# Patient Record
Sex: FEMALE | Race: WHITE | NOT HISPANIC OR LATINO | Employment: FULL TIME | URBAN - NONMETROPOLITAN AREA
[De-identification: names, ages, dates, MRNs, and addresses within clinical notes are randomized per-mention and may not be internally consistent; named-entity substitution may affect disease eponyms.]

---

## 2020-07-27 ENCOUNTER — LAB (OUTPATIENT)
Dept: LAB | Facility: HOSPITAL | Age: 50
End: 2020-07-27

## 2020-07-27 ENCOUNTER — TRANSCRIBE ORDERS (OUTPATIENT)
Dept: ADMINISTRATIVE | Facility: HOSPITAL | Age: 50
End: 2020-07-27

## 2020-07-27 DIAGNOSIS — M13.0 POLYARTHROPATHY: Primary | ICD-10-CM

## 2020-07-27 DIAGNOSIS — D89.89 RADIATION CHIMERA (HCC): ICD-10-CM

## 2020-07-27 DIAGNOSIS — D64.9 ANEMIA, UNSPECIFIED TYPE: ICD-10-CM

## 2020-07-27 DIAGNOSIS — M13.0 POLYARTHROPATHY: ICD-10-CM

## 2020-07-27 LAB — ERYTHROCYTE [SEDIMENTATION RATE] IN BLOOD: 6 MM/HR (ref 0–20)

## 2020-07-27 PROCEDURE — 83516 IMMUNOASSAY NONANTIBODY: CPT

## 2020-07-27 PROCEDURE — 85652 RBC SED RATE AUTOMATED: CPT

## 2020-07-27 PROCEDURE — 36415 COLL VENOUS BLD VENIPUNCTURE: CPT

## 2020-07-27 PROCEDURE — 84550 ASSAY OF BLOOD/URIC ACID: CPT

## 2020-07-27 PROCEDURE — 82550 ASSAY OF CK (CPK): CPT

## 2020-07-27 PROCEDURE — 86140 C-REACTIVE PROTEIN: CPT

## 2020-07-27 PROCEDURE — 86235 NUCLEAR ANTIGEN ANTIBODY: CPT

## 2020-07-28 LAB
CK SERPL-CCNC: 40 U/L (ref 20–180)
CRP SERPL-MCNC: 0.45 MG/DL (ref 0–0.5)
URATE SERPL-MCNC: 5.1 MG/DL (ref 2.4–5.7)

## 2020-07-29 LAB — ACTIN IGG SERPL-ACNC: 4 UNITS (ref 0–19)

## 2020-07-30 LAB — HISTONE IGG SER IA-ACNC: 0.8 UNITS (ref 0–0.9)

## 2021-12-15 ENCOUNTER — TRANSCRIBE ORDERS (OUTPATIENT)
Dept: ADMINISTRATIVE | Facility: HOSPITAL | Age: 51
End: 2021-12-15

## 2021-12-15 DIAGNOSIS — S83.281A ACUTE LATERAL MENISCUS TEAR OF RIGHT KNEE, INITIAL ENCOUNTER: Primary | ICD-10-CM

## 2024-07-15 ENCOUNTER — APPOINTMENT (OUTPATIENT)
Dept: GENERAL RADIOLOGY | Facility: HOSPITAL | Age: 54
End: 2024-07-15
Payer: COMMERCIAL

## 2024-07-15 ENCOUNTER — HOSPITAL ENCOUNTER (EMERGENCY)
Facility: HOSPITAL | Age: 54
Discharge: HOME | End: 2024-07-15
Payer: COMMERCIAL

## 2024-07-15 VITALS
DIASTOLIC BLOOD PRESSURE: 99 MMHG | TEMPERATURE: 97.9 F | HEART RATE: 76 BPM | SYSTOLIC BLOOD PRESSURE: 174 MMHG | OXYGEN SATURATION: 99 % | RESPIRATION RATE: 17 BRPM | WEIGHT: 250 LBS | BODY MASS INDEX: 39.24 KG/M2 | HEIGHT: 67 IN

## 2024-07-15 DIAGNOSIS — S83.91XA SPRAIN OF RIGHT KNEE, UNSPECIFIED LIGAMENT, INITIAL ENCOUNTER: Primary | ICD-10-CM

## 2024-07-15 PROCEDURE — 73562 X-RAY EXAM OF KNEE 3: CPT | Performed by: RADIOLOGY

## 2024-07-15 PROCEDURE — 99283 EMERGENCY DEPT VISIT LOW MDM: CPT

## 2024-07-15 PROCEDURE — 73562 X-RAY EXAM OF KNEE 3: CPT

## 2024-07-15 NOTE — DISCHARGE INSTRUCTIONS
Your imaging today was negative.  Unfortunately an x-ray is not the best test to rule out knee pathology.  You will very likely need an MRI to be performed.  I recommend he follow-up outpatient with your orthopedic surgeon to obtain an MRI of their specification.  At this time I recommend using a knee brace as we have given you today.  You may take ibuprofen up to twice daily as needed for pain.  Icing may reduce inflammation in the acute period.

## 2024-07-15 NOTE — ED PROVIDER NOTES
Subjective   History of Present Illness  54-year-old female with history of right knee meniscal tear presenting with acute right knee pain.  Patient states she was walking to the library on a flat surface when suddenly she felt a sharp pain in the lateral portion of her right knee and she was suddenly unable to bear weight on the knee.  Patient admits some swelling.  Denies any erythema, fever, redness.  Of note 3 years ago patient tore her right meniscus she had arthroscope performed, she was later told that she will likely need a right knee replacement however she has elected for conservative management for the last 3 years.  Patient has a an established relationship with an orthopedic surgeon in [place].  Patient decided to come into the ED just to be evaluated.      Review of Systems   Constitutional: Negative.  Negative for fever.   HENT: Negative.     Respiratory: Negative.     Cardiovascular: Negative.  Negative for chest pain.   Gastrointestinal: Negative.  Negative for abdominal pain.   Endocrine: Negative.    Genitourinary: Negative.  Negative for dysuria.   Musculoskeletal:  Positive for arthralgias and joint swelling.   Skin: Negative.    Neurological: Negative.    Psychiatric/Behavioral: Negative.     All other systems reviewed and are negative.      No past medical history on file.    Allergies   Allergen Reactions    Penicillin G Other (See Comments)     Injection site reaction      Tetanus Toxoids Other (See Comments)     Injection site rash         No past surgical history on file.    No family history on file.    Social History     Socioeconomic History    Marital status:            Objective   Physical Exam  Vitals and nursing note reviewed.   Constitutional:       General: She is not in acute distress.     Appearance: She is well-developed. She is not diaphoretic.   HENT:      Head: Normocephalic and atraumatic.      Right Ear: External ear normal.      Left Ear: External ear normal.       Nose: Nose normal.   Eyes:      Conjunctiva/sclera: Conjunctivae normal.      Pupils: Pupils are equal, round, and reactive to light.   Neck:      Vascular: No JVD.      Trachea: No tracheal deviation.   Cardiovascular:      Rate and Rhythm: Normal rate and regular rhythm.      Heart sounds: Normal heart sounds. No murmur heard.  Pulmonary:      Effort: Pulmonary effort is normal. No respiratory distress.      Breath sounds: Normal breath sounds. No wheezing.   Abdominal:      General: Bowel sounds are normal.      Palpations: Abdomen is soft.      Tenderness: There is no abdominal tenderness.   Musculoskeletal:         General: Swelling, tenderness and signs of injury present. No deformity. Normal range of motion.      Cervical back: Normal range of motion and neck supple.      Comments: Right knee swelling in the lateral compartment.  Anterior and posterior drawer test negative.  Standing grind test is positive for meniscal damage.     Skin:     General: Skin is warm and dry.      Coloration: Skin is not pale.      Findings: No erythema or rash.   Neurological:      Mental Status: She is alert and oriented to person, place, and time.      Cranial Nerves: No cranial nerve deficit.   Psychiatric:         Behavior: Behavior normal.         Thought Content: Thought content normal.         Procedures           ED Course  ED Course as of 07/15/24 1854   Mon Jul 15, 2024   1850 Patient found to be acutely hypertensive due to pain.  Offered pain medication at the time of exam patient declined anything.  Patient understands she needs to be evaluated for hypertension in the outpatient setting.  If she begins experiencing headache, changes in vision, chest pain, shortness of breath she is to return to the ED immediately or call 911. [GF]      ED Course User Index  [GF] Juventino Mari, DO                                             Medical Decision Making  54-year-old female with history of right knee issues presents  with acute right knee pain.  Physical exam consistent with a meniscal injury.  Discussed limitations of x-ray imaging at this time patient would likely need MRI.  Patient understands and agrees.  However patient still requesting x-ray.  X-ray was performed which showed no acute fracture or acute processes.  Patient was found to be hypertensive and acute pain on initial examination.  Patient denies any pain medication at this time furthermore patient states she has whitecoat hypertension.  Patient denies any chest pain, shortness of breath, headache.  Patient encouraged to follow-up outpatient with her established orthopedic surgeon in [place] for an MRI to be performed.    Problems Addressed:  Sprain of right knee, unspecified ligament, initial encounter: complicated acute illness or injury    Amount and/or Complexity of Data Reviewed  Radiology: ordered.    XR Knee 3 View Right    Result Date: 7/15/2024  Degenerative changes.   [id number]  Zip code: [id number]      This report was finalized on 7/15/2024 6:29 PM by Dr. Varun Aiken MD.         Final diagnoses:   Sprain of right knee, unspecified ligament, initial encounter       ED Disposition  ED Disposition       ED Disposition   Discharge    Condition   Stable    Comment   --               Juventino Hyman MD  [address] [place] [id number]  [phone]    Schedule an appointment as soon as possible for a visit in 1 week           Medication List      No changes were made to your prescriptions during this visit.            Juventino Mari,   07/15/24 8721

## 2024-07-26 ENCOUNTER — TRANSCRIBE ORDERS (OUTPATIENT)
Dept: ADMINISTRATIVE | Facility: HOSPITAL | Age: 54
End: 2024-07-26
Payer: COMMERCIAL

## 2024-07-26 DIAGNOSIS — M25.561 PAIN IN JOINT OF RIGHT KNEE: Primary | ICD-10-CM

## 2024-08-16 ENCOUNTER — APPOINTMENT (OUTPATIENT)
Dept: CT IMAGING | Facility: HOSPITAL | Age: 54
End: 2024-08-16
Payer: COMMERCIAL

## 2024-08-16 ENCOUNTER — HOSPITAL ENCOUNTER (EMERGENCY)
Facility: HOSPITAL | Age: 54
Discharge: HOME | End: 2024-08-16
Attending: EMERGENCY MEDICINE
Payer: COMMERCIAL

## 2024-08-16 ENCOUNTER — APPOINTMENT (OUTPATIENT)
Dept: GENERAL RADIOLOGY | Facility: HOSPITAL | Age: 54
End: 2024-08-16
Payer: COMMERCIAL

## 2024-08-16 VITALS
TEMPERATURE: 98.5 F | BODY MASS INDEX: 39.1 KG/M2 | HEART RATE: 74 BPM | DIASTOLIC BLOOD PRESSURE: 93 MMHG | RESPIRATION RATE: 16 BRPM | SYSTOLIC BLOOD PRESSURE: 152 MMHG | OXYGEN SATURATION: 99 % | WEIGHT: 249.12 LBS | HEIGHT: 67 IN

## 2024-08-16 DIAGNOSIS — T07.XXXA MULTIPLE CONTUSIONS: Primary | ICD-10-CM

## 2024-08-16 PROCEDURE — 63710000001 ONDANSETRON ODT 4 MG TABLET DISPERSIBLE: Performed by: EMERGENCY MEDICINE

## 2024-08-16 PROCEDURE — 72125 CT NECK SPINE W/O DYE: CPT | Performed by: RADIOLOGY

## 2024-08-16 PROCEDURE — 73562 X-RAY EXAM OF KNEE 3: CPT

## 2024-08-16 PROCEDURE — 73030 X-RAY EXAM OF SHOULDER: CPT

## 2024-08-16 PROCEDURE — 72125 CT NECK SPINE W/O DYE: CPT

## 2024-08-16 PROCEDURE — 73090 X-RAY EXAM OF FOREARM: CPT | Performed by: RADIOLOGY

## 2024-08-16 PROCEDURE — 73090 X-RAY EXAM OF FOREARM: CPT

## 2024-08-16 PROCEDURE — 73552 X-RAY EXAM OF FEMUR 2/>: CPT

## 2024-08-16 PROCEDURE — 70450 CT HEAD/BRAIN W/O DYE: CPT | Performed by: RADIOLOGY

## 2024-08-16 PROCEDURE — 73080 X-RAY EXAM OF ELBOW: CPT | Performed by: RADIOLOGY

## 2024-08-16 PROCEDURE — 73080 X-RAY EXAM OF ELBOW: CPT

## 2024-08-16 PROCEDURE — 72192 CT PELVIS W/O DYE: CPT

## 2024-08-16 PROCEDURE — 70450 CT HEAD/BRAIN W/O DYE: CPT

## 2024-08-16 PROCEDURE — 72192 CT PELVIS W/O DYE: CPT | Performed by: RADIOLOGY

## 2024-08-16 PROCEDURE — 73562 X-RAY EXAM OF KNEE 3: CPT | Performed by: RADIOLOGY

## 2024-08-16 PROCEDURE — 73552 X-RAY EXAM OF FEMUR 2/>: CPT | Performed by: RADIOLOGY

## 2024-08-16 PROCEDURE — 99284 EMERGENCY DEPT VISIT MOD MDM: CPT

## 2024-08-16 PROCEDURE — 73030 X-RAY EXAM OF SHOULDER: CPT | Performed by: RADIOLOGY

## 2024-08-16 RX ORDER — SODIUM CHLORIDE 0.9 % (FLUSH) 0.9 %
10 SYRINGE (ML) INJECTION AS NEEDED
Status: DISCONTINUED | OUTPATIENT
Start: 2024-08-16 | End: 2024-08-16 | Stop reason: HOSPADM

## 2024-08-16 RX ORDER — OXYCODONE AND ACETAMINOPHEN 10; 325 MG/1; MG/1
1 TABLET ORAL EVERY 6 HOURS PRN
Qty: 10 TABLET | Refills: 0 | Status: SHIPPED | OUTPATIENT
Start: 2024-08-16

## 2024-08-16 RX ORDER — ONDANSETRON 4 MG/1
4 TABLET, ORALLY DISINTEGRATING ORAL EVERY 8 HOURS PRN
Qty: 30 TABLET | Refills: 0 | Status: SHIPPED | OUTPATIENT
Start: 2024-08-16

## 2024-08-16 RX ORDER — ONDANSETRON 4 MG/1
4 TABLET, ORALLY DISINTEGRATING ORAL ONCE
Status: COMPLETED | OUTPATIENT
Start: 2024-08-16 | End: 2024-08-16

## 2024-08-16 RX ORDER — OXYCODONE AND ACETAMINOPHEN 10; 325 MG/1; MG/1
1 TABLET ORAL ONCE
Status: COMPLETED | OUTPATIENT
Start: 2024-08-16 | End: 2024-08-16

## 2024-08-16 RX ADMIN — OXYCODONE HYDROCHLORIDE AND ACETAMINOPHEN 1 TABLET: 10; 325 TABLET ORAL at 17:09

## 2024-08-16 RX ADMIN — ONDANSETRON 4 MG: 4 TABLET, ORALLY DISINTEGRATING ORAL at 17:09
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
## 2025-07-10 ENCOUNTER — HOSPITAL ENCOUNTER (OUTPATIENT)
Dept: MAMMOGRAPHY | Facility: HOSPITAL | Age: 55
Discharge: HOME | End: 2025-07-10
Admitting: FAMILY MEDICINE
Payer: MEDICARE

## 2025-07-10 DIAGNOSIS — Z12.31 VISIT FOR SCREENING MAMMOGRAM: ICD-10-CM

## 2025-07-10 PROCEDURE — 77063 BREAST TOMOSYNTHESIS BI: CPT | Performed by: RADIOLOGY

## 2025-07-10 PROCEDURE — 77067 SCR MAMMO BI INCL CAD: CPT

## 2025-07-10 PROCEDURE — 77067 SCR MAMMO BI INCL CAD: CPT | Performed by: RADIOLOGY

## 2025-07-10 PROCEDURE — 77063 BREAST TOMOSYNTHESIS BI: CPT
